# Patient Record
Sex: MALE | Race: WHITE | NOT HISPANIC OR LATINO | Employment: FULL TIME | URBAN - NONMETROPOLITAN AREA
[De-identification: names, ages, dates, MRNs, and addresses within clinical notes are randomized per-mention and may not be internally consistent; named-entity substitution may affect disease eponyms.]

---

## 2017-07-06 ENCOUNTER — AMBULATORY - GICH (OUTPATIENT)
Dept: SCHEDULING | Facility: OTHER | Age: 27
End: 2017-07-06

## 2018-02-22 ENCOUNTER — DOCUMENTATION ONLY (OUTPATIENT)
Dept: FAMILY MEDICINE | Facility: OTHER | Age: 28
End: 2018-02-22

## 2018-02-22 PROBLEM — L70.8 OTHER ACNE: Status: ACTIVE | Noted: 2018-02-22

## 2018-02-22 PROBLEM — N43.3 HYDROCELE, LEFT: Status: ACTIVE | Noted: 2018-02-22

## 2018-02-22 PROBLEM — N45.3 EPIDIDYMO-ORCHITIS: Status: ACTIVE | Noted: 2018-02-22

## 2019-03-28 DIAGNOSIS — Z13.71 SCREENING FOR GENETIC DISEASE CARRIER STATUS: Primary | ICD-10-CM

## 2019-03-28 NOTE — PROGRESS NOTES
Patient's wife is currently pregnant and a cystic fibrosis carrier. Order placed for testing.    Fiona Adan RN...................3/28/2019 10:15 AM

## 2019-04-03 DIAGNOSIS — Z13.71 SCREENING FOR GENETIC DISEASE CARRIER STATUS: ICD-10-CM

## 2019-04-03 PROCEDURE — 36415 COLL VENOUS BLD VENIPUNCTURE: CPT | Performed by: OBSTETRICS & GYNECOLOGY

## 2019-04-03 PROCEDURE — 81220 CFTR GENE COM VARIANTS: CPT | Performed by: OBSTETRICS & GYNECOLOGY

## 2019-04-18 ENCOUNTER — TELEPHONE (OUTPATIENT)
Dept: OBGYN | Facility: OTHER | Age: 29
End: 2019-04-18

## 2019-04-18 NOTE — TELEPHONE ENCOUNTER
Call made to Thuy with [facility]. She seeking to inquire about Cystic Fibrosis results to verify presence or absence of 2 specific markers to make a recommendation of action on his spouse's cystic fibrosis results.       Rima Mendez RN on 4/18/2019 at 10:28 AM     Princess Bailey(PA)

## 2019-04-18 NOTE — TELEPHONE ENCOUNTER
Call placed to [facility] to inquire about status of cystic fibrosis testing.  unable to provide update.  to call [facility] to inquire about ETA of final lab test result.       Rima Mendez RN on 4/18/2019 at 10:09 AM

## 2019-04-18 NOTE — TELEPHONE ENCOUNTER
[facility]  stated that [facility] had to rerun Cystic Fibrosis testing. ETA for final results is today.       Rima Mendez RN on 4/18/2019 at 10:25 AM

## 2019-04-19 LAB
CFTR ALLELE 2 BLD/T QL: NEGATIVE
CFTR P.R117H+5T VAR BLD/T QL: NORMAL
CYSTIC FIBROSIS 165 VARIANT INTERP: NORMAL

## 2019-08-12 ENCOUNTER — OFFICE VISIT (OUTPATIENT)
Dept: FAMILY MEDICINE | Facility: OTHER | Age: 29
End: 2019-08-12
Attending: FAMILY MEDICINE
Payer: COMMERCIAL

## 2019-08-12 VITALS
HEIGHT: 71 IN | DIASTOLIC BLOOD PRESSURE: 80 MMHG | TEMPERATURE: 97.4 F | RESPIRATION RATE: 20 BRPM | BODY MASS INDEX: 26.74 KG/M2 | HEART RATE: 70 BPM | SYSTOLIC BLOOD PRESSURE: 120 MMHG | WEIGHT: 191 LBS

## 2019-08-12 DIAGNOSIS — D22.9 SKIN MOLE: Primary | ICD-10-CM

## 2019-08-12 PROCEDURE — 99212 OFFICE O/P EST SF 10 MIN: CPT | Performed by: FAMILY MEDICINE

## 2019-08-12 ASSESSMENT — ENCOUNTER SYMPTOMS
CHILLS: 0
COLOR CHANGE: 0
COUGH: 0
WOUND: 0
FEVER: 0

## 2019-08-12 ASSESSMENT — PAIN SCALES - GENERAL: PAINLEVEL: NO PAIN (0)

## 2019-08-12 ASSESSMENT — MIFFLIN-ST. JEOR: SCORE: 1853.5

## 2019-08-12 NOTE — PROGRESS NOTES
"  SUBJECTIVE:   Nursing Notes:   Esperanza Louis LPN  8/12/2019  1:34 PM  Sign at exiting of workspace  Chief Complaint   Patient presents with     Derm Problem     mole       Initial /80 (BP Location: Right arm, Patient Position: Sitting, Cuff Size: Adult Regular)   Pulse 70   Temp 97.4  F (36.3  C) (Tympanic)   Resp 20   Ht 1.803 m (5' 11\")   Wt 86.6 kg (191 lb)   BMI 26.64 kg/m    Estimated body mass index is 26.64 kg/m  as calculated from the following:    Height as of this encounter: 1.803 m (5' 11\").    Weight as of this encounter: 86.6 kg (191 lb).  Medication Reconciliation: complete    DARWIN Ross Enedina is a 29 year old male who presents to clinic today to have a mole evaluated.  It has changed a bit over the past few months.  His wife wanted to have him have it checked.  It has not caused pain, itching.  It has not bled or changed color significantly.  It has gotten a little larger.    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  Has had dented left ribs since then.     Personal history of other medical treatment (CODE)     No Comments Provided      Past Surgical History:   Procedure Laterality Date     OTHER SURGICAL HISTORY      [id number],OTHER     Family History   Problem Relation Age of Onset     Hypertension Mother         Hypertension     Hypertension Father         Hypertension     Other - See Comments Maternal Grandfather         Parkinson's disease     Other - See Comments Paternal Grandmother         Macular degeneration     Cancer Paternal Grandfather         Cancer,CLL     Skin " "Cancer Paternal Grandfather         Skin cancer,?basal cell or squamous cell     Hypertension Sister         Hypertension     Social History     Tobacco Use     Smoking status: Never Smoker     Smokeless tobacco: Never Used   Substance Use Topics     Alcohol use: Yes     Alcohol/week: 1.2 oz     Social History     Social History Narrative    Lives with wife and three daughters.  Works for [employer].  Ladi - Wife  Klaudia - daughter  Drake - twin daughter  Shelia - twin daughter    Fredy Mcconnell - Father   Rabia Mcconnell - Mother     No current outpatient medications on file.     No Known Allergies    Review of Systems   Constitutional: Negative for chills and fever.   Respiratory: Negative for cough.    Skin: Negative for color change and wound.        OBJECTIVE:     /80 (BP Location: Right arm, Patient Position: Sitting, Cuff Size: Adult Regular)   Pulse 70   Temp 97.4  F (36.3  C) (Tympanic)   Resp 20   Ht 1.803 m (5' 11\")   Wt 86.6 kg (191 lb)   BMI 26.64 kg/m    Body mass index is 26.64 kg/m .  Physical Exam   Constitutional: He appears well-developed.   HENT:   Head: Normocephalic.   Eyes: Pupils are equal, round, and reactive to light.   Skin:   Right upper posterior arm - there is an approximate 3 mm light brown, slightly raised mole with hair growing out of the center of it.  It does have smooth margins.         PHQ-9 SCORE 10/31/2016   PHQ-9 Total Score 0     I personally reviewed results withpatient as listed below:   Diagnostic Test Results:  none     ASSESSMENT/PLAN:       ICD-10-CM    1. Skin mole D22.9        1.  This has a reassuring appearance and doesn't have featured that look worrisome for skin cancer at this time.  Discussed ABCDs of skin cancer as signs to watch for.  Follow up if any changes noted.    Jaqui Bullard MD  [facility] AND [facility]  " "bronchoscopy biopsy"

## 2019-08-12 NOTE — NURSING NOTE
"Chief Complaint   Patient presents with     Derm Problem     mole       Initial /80 (BP Location: Right arm, Patient Position: Sitting, Cuff Size: Adult Regular)   Pulse 70   Temp 97.4  F (36.3  C) (Tympanic)   Resp 20   Ht 1.803 m (5' 11\")   Wt 86.6 kg (191 lb)   BMI 26.64 kg/m   Estimated body mass index is 26.64 kg/m  as calculated from the following:    Height as of this encounter: 1.803 m (5' 11\").    Weight as of this encounter: 86.6 kg (191 lb).  Medication Reconciliation: complete    Esperanza Louis LPN  "

## 2020-04-13 ENCOUNTER — VIRTUAL VISIT (OUTPATIENT)
Dept: FAMILY MEDICINE | Facility: OTHER | Age: 30
End: 2020-04-13
Attending: FAMILY MEDICINE
Payer: COMMERCIAL

## 2020-04-13 VITALS — WEIGHT: 195 LBS | BODY MASS INDEX: 27.2 KG/M2

## 2020-04-13 DIAGNOSIS — K21.00 GASTROESOPHAGEAL REFLUX DISEASE WITH ESOPHAGITIS: ICD-10-CM

## 2020-04-13 DIAGNOSIS — Z57.9 OCCUPATIONAL EXPOSURE IN WORKPLACE: ICD-10-CM

## 2020-04-13 DIAGNOSIS — R07.89 XYPHOIDALGIA: Primary | ICD-10-CM

## 2020-04-13 DIAGNOSIS — K90.49 FOOD INTOLERANCE: ICD-10-CM

## 2020-04-13 PROCEDURE — 99212 OFFICE O/P EST SF 10 MIN: CPT | Mod: 95 | Performed by: FAMILY MEDICINE

## 2020-04-13 SDOH — HEALTH STABILITY - PHYSICAL HEALTH: OCCUPATIONAL EXPOSURE TO UNSPECIFIED RISK FACTOR: Z57.9

## 2020-04-13 ASSESSMENT — PAIN SCALES - GENERAL: PAINLEVEL: NO PAIN (1)

## 2020-04-13 ASSESSMENT — PATIENT HEALTH QUESTIONNAIRE - PHQ9: SUM OF ALL RESPONSES TO PHQ QUESTIONS 1-9: 0

## 2020-04-13 NOTE — NURSING NOTE
Patient having a phone visit for stomach he feels he may have developed an allergy to possible alcohol and in general he has a woozy feeling. Anything with malt beverage seems to bother him this year. He notices pain near his xyphoid process when he puts pressure on his stomach. He notes that the chest sticks outs more. Medication Reconciliation: complete.    Becca Lyles LPN  4/13/2020 11:16 AM

## 2020-04-13 NOTE — PROGRESS NOTES
"Rosemary Mcconnell is a 30 year old male who is being evaluated via a billable telephone visit.      The patient has been notified of following:     \"This telephone visit will be conducted via a call between you and your physician/provider. We have found that certain health care needs can be provided without the need for a physical exam.  This service lets us provide the care you need with a short phone conversation.  If a prescription is necessary we can send it directly to your pharmacy.  If lab work is needed we can place an order for that and you can then stop by our lab to have the test done at a later time.    Telephone visits are billed at different rates depending on your insurance coverage. During this emergency period, for some insurers they may be billed the same as an in-person visit.  Please reach out to your insurance provider with any questions.    If during the course of the call the physician/provider feels a telephone visit is not appropriate, you will not be charged for this service.\"    Patient has given verbal consent for Telephone visit?  Yes      Subjective     Rosemary Mcconnell is a 30 year old male who presents to clinic today for the following health issues:    Has pain over his xyphoid process.  Cannot lay on his stomach on the floor.  Tender to touch recently.  It does protrude a bit.  Doesn't feel any subcutaneous lumps/cysts/etc.  Doesn't recall doing anything that would have caused this to start hurting.  Sometimes has some gastroesophageal reflux disease symptoms, but not on a regular basis.  Sometimes feels like the discomfort is under his diaphragm.  If it gets bad, might take some peptobismol.    About a year ago, he got sick after drinking just 2 beers.  He would go to bed, then wake up feeling extremely full and would have vomiting and diarrhea.  He can drink apple flavored ciders or aaliyah artois without difficulty.  Neither of these beverages contain malt.  Bud light and " Coors seem to be worse.  He wonders if he has an allergy or intolerance to malt.    He works at [employer].  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  He has not had any testing for several years and wonders how he should go about doing this.               Patient Active Problem List   Diagnosis     Other acne     Epididymo-orchitis     Hydrocele, left     Past Surgical History:   Procedure Laterality Date     OTHER SURGICAL HISTORY      [id number],OTHER       Social History     Tobacco Use     Smoking status: Never Smoker     Smokeless tobacco: Never Used   Substance Use Topics     Alcohol use: Yes     Alcohol/week: 2.0 standard drinks     Comment: moderate      Family History   Problem Relation Age of Onset     Hypertension Mother         Hypertension     Hypertension Father         Hypertension     Other - See Comments Maternal Grandfather         Parkinson's disease     Other - See Comments Paternal Grandmother         Macular degeneration     Cancer Paternal Grandfather         Cancer,CLL     Skin Cancer Paternal Grandfather         Skin cancer,?basal cell or squamous cell     Hypertension Sister         Hypertension         No current outpatient medications on file.     No Known Allergies    Reviewed and updated as needed this visit by Provider         Review of Systems   ROS COMP: Constitutional, HEENT, cardiovascular, pulmonary, gi and gu systems are negative, except as otherwise noted.       Objective   Reported vitals:  Wt 88.5 kg (195 lb)   BMI 27.20 kg/m     healthy, alert and no distress  PSYCH: Alert and oriented times 3; coherent speech, normal   rate and volume, able to articulate logical thoughts, able   to abstract reason, no tangential thoughts, no hallucinations   or delusions  His affect is normal  RESP: No cough, no audible wheezing, able to talk in full sentences  Remainder of exam unable to be completed  due to telephone visits    Diagnostic Test Results:  Labs reviewed in Epic        Assessment/Plan:  1. Xyphoidalgia  May be from the children jumping on his chest and back.  Recommended use of ibuprofen 600 mg 3 times a day for 1 to 2 weeks.  If not improving, he may follow-up by phone.    2. Gastroesophageal reflux disease with esophagitis  He has some mild GERD symptoms.  Discussed that he could use some omeprazole 20 mg daily for 2 weeks, particularly while he is taking omeprazole to make sure that is not flaring this problem.    3. Food intolerance  He may have a food intolerance to MALT.  Order placed for labs to evaluate this per his request.  He will make a lab only appointment at a later date.  - Allergen Malt; Future    4. Occupational exposure in workplace  Recommended that he discuss with his employee health department at work to see what their process is for doing the surveillance testing.  Discussed that if it is no longer offered at his workplace, he may contact us to have spirometry, chest x-ray and hearing evaluation ordered.      No follow-ups on file.      Phone call duration:  13 minutes    Jaqui Bullard MD

## 2020-11-12 ENCOUNTER — NURSE TRIAGE (OUTPATIENT)
Dept: NURSING | Facility: CLINIC | Age: 30
End: 2020-11-12

## 2020-11-13 NOTE — TELEPHONE ENCOUNTER
Called about a sudden pain that he had today while at work.  States the pain was on his right side, over the ribs and groin.  States that the pain was severe and was sweating when this happened, caller states he knows it was his appendix because he does not have one.  States he thinks it was a kidney stone.  Caller rated his pain at the time of this call to be 1/10.  States he has not tried to urinate after the pain had subsided, not sure if urine will be bloody or not and does not know if he will have difficulty urinating.  Lulu Pratt RN  COVID 19 Nurse Triage Plan/Patient Instructions    Please be aware that novel coronavirus (COVID-19) may be circulating in the community. If you develop symptoms such as fever, cough, or SOB or if you have concerns about the presence of another infection including coronavirus (COVID-19), please contact your health care provider or visit [email or web address].     Disposition/Instructions    In-Person Visit with provider recommended. Reference Visit Selection Guide.    Thank you for taking steps to prevent the spread of this virus.  o Limit your contact with others.  o Wear a simple mask to cover your cough.  o Wash your hands well and often.    Resources    [facility]: About COVID-19: [email or web address]    CDC: What to Do If You're Sick: www.cdc.gov/coronavirus/2019-ncov/about/steps-when-sick.html    CDC: Ending Home Isolation: www.cdc.gov/coronavirus/2019-ncov/hcp/disposition-in-home-patients.html     CDC: Caring for Someone: www.cdc.gov/coronavirus/2019-ncov/if-you-are-sick/care-for-someone.html     [facility]: Interim Guidance for Hospital Discharge to Home: www.health.[facility].[place].us/diseases/coronavirus/hcp/hospdischarge.pdf    [facility] clinical trials (COVID-19 research studies): [email or web address].[email or web address]/umn-clinical-trials     Below are the COVID-19 hotlines at the [place] Department of Health ([facility]). Interpreters are available.   o For health  questions: Call 752-775-6414 or 1-460.424.9237 (7 a.m. to 7 p.m.)  o For questions about schools and childcare: Call 554-085-5365 or 1-207.836.7484 (7 a.m. to 7 p.m.)                       Additional Information    Negative: Shock suspected (e.g., cold/pale/clammy skin, too weak to stand, low BP, rapid pulse)    Negative: Sounds like a life-threatening emergency to the triager    Negative: Followed a genital area injury    Negative: Followed a genital area injury (penis, scrotum)    Negative: Vaginal discharge    Negative: Pus (white, yellow) or bloody discharge from end of penis    Negative: [1] Taking antibiotic for urinary tract infection (UTI) AND [2] female    Negative: [1] Taking antibiotic for urinary tract infection (UTI) AND [2] male    Negative: [1] Discomfort (pain, burning or stinging) when passing urine AND [2] pregnant    Negative: [1] Discomfort (pain, burning or stinging) when passing urine AND [2] postpartum (from 0 to 6 weeks after delivery)    Negative: [1] Discomfort (pain, burning or stinging) when passing urine AND [2] female    Negative: [1] Discomfort (pain, burning or stinging) when passing urine AND [2] male    Negative: Pain or itching in the vulvar area    Negative: Pain in scrotum is main symptom    Negative: Blood in the urine is main symptom    Negative: Symptoms arising from use of a urinary catheter (Lee or Coude)    Negative: [1] Unable to urinate (or only a few drops) > 4 hours AND     [2] bladder feels very full (e.g., palpable bladder or strong urge to urinate)    Negative: [1] Decreased urination and [2] drinking very little AND [2] dehydration suspected (e.g., dark urine, no urine > 12 hours, very dry mouth, very lightheaded)    Negative: Patient sounds very sick or weak to the triager    Negative: Fever > 100.5 F (38.1 C)    Side (flank) or lower back pain present    Protocols used: URINARY SYMPTOMS-A-AH

## 2022-04-18 NOTE — PROGRESS NOTES
"Nursing Notes:   Megan Chaney LPN  4/21/2022  9:51 AM  Sign at exiting of workspace  Chief Complaint   Patient presents with     Derm Problem     Here today with multiple skin concerns.     Medication Reconciliation: complete    Megan Chaney LPN      Subjective   Rosemary is a 32 year old who presents for the following health issues     Has had a rash in his groin, butt and around his ears.  Very itchy.  Has a white scale on his ears.  Could not get rid of it.  Tried lotion, changing soaps.  Has not tried any steroids.  His daughter has had a yeast skin infection recently.  They have a water softener as well.  No family history or personal history of psoriasis or eczema.  He has had this issue for almost a year.      Has a bump on his butt that showed up a week and a half ago.  It is getting smaller.  It was painful.  Couldn't tell if it was red.    Interested in getting a vasectomy.  He and his wife have 4 kids and they are certain they are done. She has already had a tubal ligation, but they have a friend who recently had a baby despite having had a tubal ligation as well.  They want to make sure all of their bases are covered.    Has developed a reaction to alcohol if he drinks it.  He gets vomiting and diarrhea after drinking any beer.  Some ciders bother him as well, which contain malt.  If he drinks crown royal and sprite, he is able to tolerate that.  No constriction of his throat or hives with this.  He gets a pain in his belly 1 to several hours after drinking.  Will have to vomit and have diarrhea.  Once he is \"cleaned out\" he feels fine.    Has had pain in both arms.  He has pain over his lateral epicondyles on both sides.  Sharp, burning, shooting type of pain.  Hurts all day.  Lifting making it worse.  Twisting movements makes it worse.  Over the winter, hurt to even lift up his kids.  At times has numbness in his 4th-5th fingers.  His dad had to have ulnar nerve transfer surgery.    History of " Present Illness       Reason for visit:  Check-up  Symptom onset:  More than a month    He eats 2-3 servings of fruits and vegetables daily.He consumes 2 sweetened beverage(s) daily.He exercises with enough effort to increase his heart rate 9 or less minutes per day.  He exercises with enough effort to increase his heart rate 3 or less days per week.   He is taking medications regularly.       Derm      Review of Systems   Constitutional, HEENT, cardiovascular, pulmonary, GI, , musculoskeletal, neuro, skin, endocrine and psych systems are negative, except as otherwise noted.      Objective    There were no vitals taken for this visit.  There is no height or weight on file to calculate BMI.  Physical Exam  Constitutional:       Appearance: Normal appearance.   HENT:      Head: Normocephalic.   Eyes:      Extraocular Movements: Extraocular movements intact.      Pupils: Pupils are equal, round, and reactive to light.   Cardiovascular:      Rate and Rhythm: Normal rate and regular rhythm.      Pulses: Normal pulses.      Heart sounds: Normal heart sounds. No murmur heard.  Pulmonary:      Effort: Pulmonary effort is normal.      Breath sounds: Wheezing present. No rhonchi or rales.   Musculoskeletal:      Comments: No pain with palpation over his lateral or medal epicondyles.      Tinel's negative bilaterally.  Phalen's negative bilaterally.  No reproduction of symptoms with palpation over ulnar grooves.   Skin:     Comments: No current rash around his ears, groin or buttock region.    In the area where the soft tissue mass has been in his right buttock just lateral to the anus, there is no appreciable mass palpable.  There are no overlying skin changes in this area.     Neurological:      Mental Status: He is alert.   Psychiatric:         Mood and Affect: Mood normal.         Behavior: Behavior normal.            Assessment & Plan       ICD-10-CM    1. Rash  R21 terbinafine (LAMISIL) 1 % external cream   2. Soft  tissue mass  M79.89    3. Family planning  Z30.09 Adult Urology Referral   4. Adverse food reaction, initial encounter  T78.1XXA Allergen barley IgE     Allergen barley IgE   5. Paresthesia of both hands  R20.2 EMG   6. Bilateral elbow joint pain  M25.521     M25.522      1.  Rash at the moment has resolved, but he states that it comes and goes.  Given location in his groin and buttock area, may be fungal.  Trial of lamisil.  If not improving, could also could try a topical steroid.  Eczema and psoriasis also in the differential diagnosis among other possibilities.  If still not improving, could consider Dermatology referral.  2.  I cannot feel this today, but it is improving.  Suspect it should resolve completely.  If it does not, would recommend referral to general surgery.  3.  Referred to Urology for vasectomy consult.  4.  Allergy testing to Barley ordered.  Discussed that if he does not have a true allergy, may still have a food reaction such as Food Protein Induced Enterocolitis Syndrome.  If this is normal and continues to have issues despite avoidance of triggers, could consider allergist or GI referral.  5.  Referred for EMG.  6.  If EMG normal, consider Occupational Therapy or Orthopedics referrals.      Jaqui Bullard MD  [facility] AND [facility]

## 2022-04-21 ENCOUNTER — OFFICE VISIT (OUTPATIENT)
Dept: FAMILY MEDICINE | Facility: OTHER | Age: 32
End: 2022-04-21
Attending: FAMILY MEDICINE
Payer: COMMERCIAL

## 2022-04-21 VITALS
TEMPERATURE: 97.3 F | DIASTOLIC BLOOD PRESSURE: 80 MMHG | HEART RATE: 82 BPM | BODY MASS INDEX: 28.01 KG/M2 | OXYGEN SATURATION: 96 % | WEIGHT: 200.8 LBS | RESPIRATION RATE: 16 BRPM | SYSTOLIC BLOOD PRESSURE: 128 MMHG

## 2022-04-21 DIAGNOSIS — R20.2 PARESTHESIA OF BOTH HANDS: ICD-10-CM

## 2022-04-21 DIAGNOSIS — R21 RASH: Primary | ICD-10-CM

## 2022-04-21 DIAGNOSIS — T78.1XXA ADVERSE FOOD REACTION, INITIAL ENCOUNTER: ICD-10-CM

## 2022-04-21 DIAGNOSIS — Z30.09 FAMILY PLANNING: ICD-10-CM

## 2022-04-21 DIAGNOSIS — M25.521 BILATERAL ELBOW JOINT PAIN: ICD-10-CM

## 2022-04-21 DIAGNOSIS — M25.522 BILATERAL ELBOW JOINT PAIN: ICD-10-CM

## 2022-04-21 DIAGNOSIS — M79.89 SOFT TISSUE MASS: ICD-10-CM

## 2022-04-21 PROCEDURE — 86003 ALLG SPEC IGE CRUDE XTRC EA: CPT | Mod: ZL | Performed by: FAMILY MEDICINE

## 2022-04-21 PROCEDURE — 99214 OFFICE O/P EST MOD 30 MIN: CPT | Performed by: FAMILY MEDICINE

## 2022-04-21 PROCEDURE — 36415 COLL VENOUS BLD VENIPUNCTURE: CPT | Mod: ZL | Performed by: FAMILY MEDICINE

## 2022-04-21 RX ORDER — PRENATAL VIT 91/IRON/FOLIC/DHA 28-975-200
COMBINATION PACKAGE (EA) ORAL 2 TIMES DAILY
Qty: 42 G | Refills: 1 | Status: SHIPPED | OUTPATIENT
Start: 2022-04-21 | End: 2022-05-19

## 2022-04-21 ASSESSMENT — PAIN SCALES - GENERAL: PAINLEVEL: NO PAIN (0)

## 2022-04-21 NOTE — NURSING NOTE
Chief Complaint   Patient presents with     Derm Problem     Here today with multiple skin concerns.     Medication Reconciliation: complete    Megan Chaney LPN

## 2022-04-22 LAB — BARLEY IGE QN: <0.1 KU(A)/L

## 2022-05-19 ENCOUNTER — OFFICE VISIT (OUTPATIENT)
Dept: UROLOGY | Facility: OTHER | Age: 32
End: 2022-05-19
Attending: FAMILY MEDICINE
Payer: COMMERCIAL

## 2022-05-19 VITALS
SYSTOLIC BLOOD PRESSURE: 110 MMHG | RESPIRATION RATE: 16 BRPM | WEIGHT: 202.6 LBS | HEART RATE: 87 BPM | OXYGEN SATURATION: 96 % | BODY MASS INDEX: 28.26 KG/M2 | DIASTOLIC BLOOD PRESSURE: 80 MMHG

## 2022-05-19 DIAGNOSIS — R21 RASH: ICD-10-CM

## 2022-05-19 DIAGNOSIS — B35.6 JOCK ITCH: ICD-10-CM

## 2022-05-19 DIAGNOSIS — Z30.09 ENCOUNTER FOR VASECTOMY COUNSELING: Primary | ICD-10-CM

## 2022-05-19 PROCEDURE — 99204 OFFICE O/P NEW MOD 45 MIN: CPT | Performed by: UROLOGY

## 2022-05-19 RX ORDER — PRENATAL VIT 91/IRON/FOLIC/DHA 28-975-200
COMBINATION PACKAGE (EA) ORAL 2 TIMES DAILY
Qty: 42 G | Refills: 1 | Status: SHIPPED | OUTPATIENT
Start: 2022-05-19 | End: 2023-01-12

## 2022-05-19 RX ORDER — FLUCONAZOLE 150 MG/1
150 TABLET ORAL ONCE
Qty: 1 TABLET | Refills: 1 | Status: SHIPPED | OUTPATIENT
Start: 2022-05-19 | End: 2022-05-19

## 2022-05-19 ASSESSMENT — PAIN SCALES - GENERAL: PAINLEVEL: NO PAIN (0)

## 2022-05-19 NOTE — PROGRESS NOTES
Type of Visit  NPV    Chief Complaint  Elective sterilization  Jock itch    HPI  Mr. Mcconnell is a 32 year old male who presents with desire for irreversible, elective sterilization.    He has been considering this decision for years and reports a high level of certainty regarding this decision.   His wife is supportive and has been involved in making this decision.   Their main reason for choosing vasectomy over other dependably reversible methods of contraception is they perceived it as the most secure way of avoiding pregnancy and they dislike other contraceptive measures.   He does not have interest in future fertility.   He has made this decision free any coercion. He does have some anxiety/fear regarding this procedure, specifically regarding the anticipated pain during the procedure.   He is not anxious/fearful of the finality/permanence of the procedure.   He is not anxious/fearful of the potential/risk of complications.  He denies erectile dysfunction.  He denies a history of bleeding disorders or reactions to local anesthetic.    He also has issues regarding jock itch.  He was prescribed terbinifine cream a few weeks ago.  Cream improved but didn't resolve the redness and itching.  The patient has a big  and perspires throughout the workday.      Past Medical History  He  has a past medical history of Closed fracture of one rib and Personal history of other medical treatment (CODE).  Patient Active Problem List   Diagnosis     Other acne     Epididymo-orchitis     Hydrocele, left       Past Surgical History  He  has a past surgical history that includes other surgical history.    Medications  He currently has no medications in their medication list.    Allergies  No Known Allergies    Social History  He  reports that he has never smoked. He has never used smokeless tobacco. He reports current alcohol use of about 2.0 standard drinks of alcohol per week. He reports that he does not use  drugs.  No drug abuse.    Family History  Family History   Problem Relation Age of Onset     Hypertension Mother         Hypertension     Hypertension Father         Hypertension     Other - See Comments Maternal Grandfather         Parkinson's disease     Other - See Comments Paternal Grandmother         Macular degeneration     Cancer Paternal Grandfather         Cancer,CLL     Skin Cancer Paternal Grandfather         Skin cancer,?basal cell or squamous cell     Hypertension Sister         Hypertension       Review of Systems  I personally reviewed the ROS with the patient.    Nursing Notes:   Josy Taveras LPN  5/19/2022  8:25 AM  Addendum  Chief Complaint   Patient presents with     Consult     Vasectomy Consult    patient presents to the clinic today for a vasectomy consult.    Review of Systems:    Weight loss:    No     Recent fever/chills:  No   Night sweats:   No  Current skin rash:  Yes   Recent hair loss:  No  Heat intolerance:  No   Cold intolerance:  No  Chest pain:   No   Palpitations:   No  Shortness of breath:  No   Wheezing:   No  Constipation:    No   Diarrhea:   No   Nausea:   No   Vomiting:   No   Kidney/side pain:  No   Back pain:   No  Frequent headaches:  No   Dizziness:     No  Leg swelling:   No   Calf pain:    No    Parents, brothers or sisters with history of kidney cancer:   No  Parents, brothers or sisters with history of bladder cancer: No       Medication Reconciliation: completed   Josy Taveras LPN  5/19/2022 8:20 AM       Physical Exam  Vitals:    05/19/22 0825   BP: 110/80   BP Location: Right arm   Patient Position: Sitting   Cuff Size: Adult Large   Pulse: 87   Resp: 16   SpO2: 96%   Weight: 91.9 kg (202 lb 9.6 oz)     Constitutional: NAD, WDWN.   Head: NCAT  Eyes: Conjunctivae normal  Cardiovascular: Regular rate.  Pulmonary/Chest: Respirations are even and non-labored bilaterally.  Abdominal: Soft. No distension, tenderness, masses or guarding. No CVA  tenderness.  Neurological: A + O x 3.  Cranial Nerves II-XII grossly intact.  Extremities: MIREILLE x 4, Warm. No clubbing.  No cyanosis.    Skin: Pink, warm and dry.  No rashes noted.  Psychiatric:  Normal mood and affect  Genitourinary:  Phallus normal without lesions, testicles descended bilaterally, no masses.    Bilateral vasa palpable  Dry cracked inguinal skin bilaterally    Assessment  Mr. Mcconnell is a 32 year old male who presents today requesting permanent, irreversible surgical sterilization as well as a second medical problem of jock itch.    The vasectomy procedure was discussed in detail with the patient today including the following:  - Preparation prior to the procedure  - Expectations the day of the procedure  - Risks of the procedure such as sterilization failure, infection, bleeding and/or development of chronic testicular pain.    - Expectations and limitations during recovery    Furthermore, the patient was told he would remain fertile following the procedure until he provided a semen analysis that met the definition of infertile (no sperm present or <100,000 nonmotile sperm/mL).  This is usually planned for 3 months after the procedure.  The patient expressed understanding and desire to proceed.    Provided vasectomy hand out again outlining the above risks and benefits as well as need for using current form of birth control until follow up semen analysis meets the definition of sterility.    Plan  Fluconazole 150mg x 1  Refilled terbinafine topical cream  Schedule vasectomy

## 2022-05-19 NOTE — NURSING NOTE
Chief Complaint   Patient presents with     Consult     Vasectomy Consult    patient presents to the clinic today for a vasectomy consult.    Review of Systems:    Weight loss:    No     Recent fever/chills:  No   Night sweats:   No  Current skin rash:  Yes   Recent hair loss:  No  Heat intolerance:  No   Cold intolerance:  No  Chest pain:   No   Palpitations:   No  Shortness of breath:  No   Wheezing:   No  Constipation:    No   Diarrhea:   No   Nausea:   No   Vomiting:   No   Kidney/side pain:  No   Back pain:   No  Frequent headaches:  No   Dizziness:     No  Leg swelling:   No   Calf pain:    No    Parents, brothers or sisters with history of kidney cancer:   No  Parents, brothers or sisters with history of bladder cancer: No       Medication Reconciliation: completed   Josy Taveras LPN  5/19/2022 8:20 AM

## 2022-05-21 ENCOUNTER — HEALTH MAINTENANCE LETTER (OUTPATIENT)
Age: 32
End: 2022-05-21

## 2022-06-06 ENCOUNTER — OFFICE VISIT (OUTPATIENT)
Dept: UROLOGY | Facility: OTHER | Age: 32
End: 2022-06-06
Attending: UROLOGY
Payer: COMMERCIAL

## 2022-06-06 VITALS
OXYGEN SATURATION: 97 % | HEART RATE: 95 BPM | RESPIRATION RATE: 16 BRPM | WEIGHT: 196 LBS | BODY MASS INDEX: 27.34 KG/M2

## 2022-06-06 DIAGNOSIS — Z30.2 ENCOUNTER FOR VASECTOMY: Primary | ICD-10-CM

## 2022-06-06 PROCEDURE — 55250 REMOVAL OF SPERM DUCT(S): CPT | Performed by: UROLOGY

## 2022-06-06 ASSESSMENT — PAIN SCALES - GENERAL: PAINLEVEL: NO PAIN (0)

## 2022-06-06 NOTE — NURSING NOTE
Chief Complaint   Patient presents with     Procedure     Vasectomy        Vasectomy  Per verbal order read back by Jose Martin Christensen MD to prep patient for vasectomy.  Patient positioned in supine position, perineum area prepped with chlorhexidene Gluconate and patient draped per sterile technique.    Lidocaine 2%  Lot #: [id number]  Expiration date: 01/26  : Iwona Calvillo  NDC: 4337990432    Universal Protocol    A. Pre-procedure verification complete Yes  1-relevant information / documentation available, reviewed and properly matched to the patient; 2-consent accurate and complete, 3-equipment and supplies available    B. Site marking complete Yes  Site marked if not in continuous attendance with patient    C. TIME OUT completed Yes  Time Out was conducted just prior to starting procedure to verify the eight required elements: 1-patient identity, 2-consent accurate and complete, 3-position, 4-correct side/site marked (if applicable), 5-procedure, 6-relevant images / results properly labeled and displayed (if applicable), 7-antibiotics / irrigation fluids (if applicable), 8-safety precautions.    After procedure perineum area rinsed. Semen analysis container given to patient. Patient reminded to have a semen analysis performed 3 months after the procedure to confirm sterility and to ejaculate about 1-2 dozen times following the vasectomy and prior to semen collection. Discharge instructions reviewed with patient. Patient verbalized understanding of discharge instructions and discharged ambulatory.    Medication Reconciliation: completed   Josy Taveras LPN  6/6/2022 11:00 AM

## 2022-06-06 NOTE — PATIENT INSTRUCTIONS
Home Care after Vasectomy   Follow these guidelines for your care after your surgery to help your recovery.     Activity   Limit your activity for the first 5 days after the procedure to light activity.   You may return to work typically in 2 days.   Limit lifting, pushing or pulling to less than 20 pounds until you are no longer sore.   Limit running and long walks until you are no longer sore.   Limit sexual activity for 5 days.     Swelling   Scrotal swelling from the surgery may take weeks to get better. You should call your doctor if the swelling is severe and the scrotum is larger than an orange.  Apply a bag of frozen peas to the scrotum for 15 minutes every 1-2 hours for the first 48 hours when you are awake to limit swelling. It works best to not apply the bag of frozen peas directly to the skin.  Wear a jock strap to support your scrotum and reduce swelling.  Continue wearing the jock strap until you are no longer sore, 1-2 weeks.     Incision care   The single stitch placed in the scrotum will dissolve and does not need to be removed.  A small amount of blood may stain the dressings for up to 2-3 days after the procedure.  For the first few days, apply two or three gauze pads to the site each day and as needed to keep the dressing dry. This will protect the incision and help keep your clothes clean.  When you are no longer having any drainage, stop using the gauze pads over the site.     Bathing or showering   You may shower after the procedure but do not scrub the stitch area.  Allow the water to wash over the stitch and do not scrub the area. Dry the site gently by patting it with a clean towel.  Tub baths should be avoided for 7 days after surgery.  Swimming should be avoided for 14 days after surgery.        Pain control   Please take OTC ibuprofen 200mg tablets as discussed in detail in clinic.  - 4 tablets (800mg) three times a day for 3 days with food to help with swelling.    Sexual activity    Please avoid ejaculating for 5 days after procedure or until soreness is resolved.     Follow up   Following this procedure you are still considered fertile and would be able to impregnate your partner.  You should have a semen analysis performed 3 months or greater after your procedure to confirm sterility.  Ideally you would ejaculate about 2-3 dozen times following the vasectomy and prior to semen collection.  Use birth control until the semen analysis is confirmed sterile.     The semen sample needs to be dropped off at Diagnostic check in desk no later than 30 minutes after collection.  In transport keep sample body temp by keeping it next to your body such as in between your legs.  You do not have to make an appointment to drop off this sample unless you need to do the collection in house.     Use contraceptives until this is confirmed to prevent pregnancy.     Contacts   General Questions: [phone]   Appointments: [phone]   Emergencies: [phone]     When to call your doctor   Call the urology office if you have any of the following:   Severe swelling, larger than the size of an orange   A large amount of fluid drainage that soaks several pads per day   Pain that is not controlled with pain medicine, jock strap and use of frozen peas   Any signs of infection such as the following:   -Redness or tenderness around the incision site worsening after 2-3 days or   -Pus type drainage from the incision or   -Fever of greater than 101 degrees F     Also call the office if you have any questions or concerns about your care.

## 2022-06-06 NOTE — PROGRESS NOTES
Preoperative diagnosis  Elective sterilization    Postoperative diagnosis  Elective sterilization    Procedure performed  Bilateral vasectomy    Surgeon  Jose Martin Christensen MD    Anesthesia  8 mL lidocaine 2% local injection    Complications  None    EBL  <1 mL    Specimen  None    Indications  32 year old male agreed to undergo the above named procedure after discussion of the alternatives, risks and benefits.  Informed consent was obtained.      Procedure  The patient was brought to the procedure room and placed in supine position.  His scrotum was prepped with Hibiclens and he was draped in a sterile fashion.  A timeout was performed.    Lidocaine 2% was used to anesthetize the scrotal skin as well as perivasal sheath initially on the patient's left.  A 1 cm skin incision was created with the sharp-tip mosquito and a vas clamp utilized through this incision to grasp the vas.  The left vas was elevated to the skin surface and dissected free of its perivasal sheath.  The vas was transected and the lumen was cauterized both proximally and distally.  A 4-0 chromic suture was utilized to place the proximal vasal portion under the perivasal sheath, such that the 2 ends were divided by the perivasal sheath (fascial interposition).  This portion of the vas was then allowed to drop back into the left hemiscrotum.  The right side was treated next in the same manner as described above.  The skin incision was closed with the 4-0 chromic suture.  The patient tolerated the procedure well.    Plan  The patient received postvasectomy instructions:  - apply frozen peas  - take scheduled NSAIDs   - wear jock support for the next 2 to 3 days  - abstain from vigorous activity for the next week or so  - continue current family planning/birth control until negative semen analysis is confirmed.      He is aware that he is not considered sterile until follow-up ejaculatory specimens show no evidence of sperm.    We provided instructions to  submit a semen sample in 3 months.

## 2022-06-06 NOTE — LETTER
06/06/22    To whom it ila concern Rosemary Enedina had a procedure today 0606/22 and will need to be off work until 06/13/22    Thank you   Dr. Christensen

## 2022-07-06 ENCOUNTER — OFFICE VISIT (OUTPATIENT)
Dept: FAMILY MEDICINE | Facility: OTHER | Age: 32
End: 2022-07-06
Attending: PHYSICIAN ASSISTANT
Payer: COMMERCIAL

## 2022-07-06 VITALS
HEART RATE: 112 BPM | RESPIRATION RATE: 17 BRPM | DIASTOLIC BLOOD PRESSURE: 86 MMHG | TEMPERATURE: 101.2 F | SYSTOLIC BLOOD PRESSURE: 128 MMHG | OXYGEN SATURATION: 98 % | BODY MASS INDEX: 28.34 KG/M2 | HEIGHT: 71 IN | WEIGHT: 202.4 LBS

## 2022-07-06 DIAGNOSIS — J02.9 SORE THROAT: Primary | ICD-10-CM

## 2022-07-06 DIAGNOSIS — H65.91 OME (OTITIS MEDIA WITH EFFUSION), RIGHT: ICD-10-CM

## 2022-07-06 LAB — GROUP A STREP BY PCR: NOT DETECTED

## 2022-07-06 PROCEDURE — 87651 STREP A DNA AMP PROBE: CPT | Mod: ZL | Performed by: PHYSICIAN ASSISTANT

## 2022-07-06 PROCEDURE — 99213 OFFICE O/P EST LOW 20 MIN: CPT | Performed by: PHYSICIAN ASSISTANT

## 2022-07-06 RX ORDER — AMOXICILLIN 875 MG
875 TABLET ORAL 2 TIMES DAILY
Qty: 14 TABLET | Refills: 0 | Status: SHIPPED | OUTPATIENT
Start: 2022-07-06 | End: 2022-07-13

## 2022-07-06 ASSESSMENT — PAIN SCALES - GENERAL: PAINLEVEL: SEVERE PAIN (7)

## 2022-07-06 NOTE — PROGRESS NOTES
ASSESSMENT/PLAN:    I have reviewed the nursing notes.  I have reviewed the findings, diagnosis, plan and need for follow up with the patient.    1. Sore throat  - Group A Streptococcus PCR Throat Swab  - Strep test returned negative, sore throat is most consistent with viral etiology/cause at this point in time.  Recommend to continue with symptomatic remedies including but not limited to: Salt water gargles, throat lozenges, soups/popsicles, increase hydration, alternating Tylenol and ibuprofen if needed/able and other symptomatic remedies.  - Thinks he may have had COVID in June 2022, similar symptoms to wife who tested positive. Did not test today for this reason.   - He will still remain home until fever free for 24 hours without fever reducers.     2. OME (otitis media with effusion), right  - amoxicillin (AMOXIL) 875 MG tablet; Take 1 tablet (875 mg) by mouth 2 times daily for 7 days  Dispense: 14 tablet; Refill: 0  - Vital signs stable. PE consistent with OME/ear infection of right ear(s). Treatment of choice includes antibiotic regimen (Amoxicillin, alternating tylenol and ibuprofen every 4-6 hours as needed (if able, daily limits reviewed in AVS and verbally with patient), warm compresses, other symptomatic remedies. Avoid trauma to ear(s) such as Q-tips. If symptoms change or worsen, recommend follow up for reevaluation (high fevers, worsening pain, abnormal drainage or odor from ear, etc.). Discussed if ear infections become increasingly common, as this point, a referral to ENT can be made, typically by PCP. Patient is in agreement and understanding of the above treatment plan. All questions and concerns were addressed and answered to patient's satisfaction. AVS reviewed with patient.     Discussed warning signs/symptoms indicative of need to f/u    Follow up if symptoms persist or worsen or concerns    I explained my diagnostic considerations and recommendations to the patient, who voiced understanding  and agreement with the treatment plan. All questions were answered. We discussed potential side effects of any prescribed or recommended therapies, as well as expectations for response to treatments.    Aleida Fonseca  7/6/2022  10:12 AM    HPI:    Rosemary Mcconnell is a 32 year old male  who presents to [facility] today for concerns of URI, x 7-1-2022. Pt reports that he this wife was covid symptoms in June and the rest of the family had symptoms, so he thinks he may have had covid in June.    Symptoms:  Yes fevers or chills. Fever, highest reported temperature: 101.2 here  Yes sore throat/pharyngitis/tonsillitis.   No allergy/URI Symptoms  No Muffled Sounds/Change in Hearing  No Sensation of Fullness in Ear(s)  No Ringing in Ears/Tinnitus  No Balance Changes  Yes Dizziness  No Congestion (head/nasal/chest)  No Cough/Productive Cough  No Post Nasal Drip   Yes Headache  No Sinus Pain/Pressure  Yes Myalgias  Yes Otalgia right ear started last night  Activity Level Changes: Yes: fatigued  Appetite/Liquid Intake Changes: Yes: decreased  Changes to Bowel Habits: Yes: diarrhea, has diarrhea after eating  Changes to Bladder Habits: No  Additional Symptoms to Report: No  History of similar symptoms: No  Prior workup: No    Treatments tried: Tylenol/Ibuprofen, Rest and alkaseltzer +    Site of exposure: not known.  Type of exposure: not known    Vaccination status:   - Influenza: none  - COVID: none    Cardiopulmonary History:  Recent Infections (Pneumonia, etc): No  Smoker: No  Asthma: No  COPD: No  Additional History: No  Cystic Fibrosis: No  Cardiac History Including Stroke/Stent Placement/STEMI/STEMI, etc.: No    Other Pertinent History:     PCP: Saul Escalante MD    Past Medical History:   Diagnosis Date     Closed fracture of one rib     Kicked by horse around age 10.  Has had dented left ribs since then.     Personal history of other medical treatment (CODE)     No Comments Provided     Past Surgical History:  "  Procedure Laterality Date     OTHER SURGICAL HISTORY      [id number],OTHER     Social History     Tobacco Use     Smoking status: Never Smoker     Smokeless tobacco: Never Used   Substance Use Topics     Alcohol use: Yes     Alcohol/week: 2.0 standard drinks     Comment: moderate      Current Outpatient Medications   Medication Sig Dispense Refill     terbinafine (LAMISIL) 1 % external cream Apply topically 2 times daily (Patient not taking: Reported on 7/6/2022) 42 g 1     No Known Allergies  Past medical history, past surgical history, current medications and allergies reviewed and accurate to the best of my knowledge.      ROS:  Refer to HPI    /86   Pulse 112   Temp (!) 101.2  F (38.4  C) (Tympanic)   Resp 17   Ht 1.803 m (5' 11\")   Wt 91.8 kg (202 lb 6.4 oz)   SpO2 98%   BMI 28.23 kg/m      EXAM:  General Appearance: Well appearing 32 year old male, appropriate appearance for age. No acute distress   Ears: Left TM intact, translucent with bony landmarks appreciated, no erythema, no effusion, no bulging, no purulence.  Right TM intact, translucent with bony landmarks appreciated, + erythema,+  effusion, no bulging, no purulence.  Left auditory canal clear.  Right auditory canal clear.  Normal external ears, non tender.  Eyes: conjunctivae normal without erythema or irritation, corneas clear, no drainage or crusting, no eyelid swelling, pupils equal   Oropharynx: moist mucous membranes, posterior pharynx with erythema, tonsils symmetric, + erythema, + exudates, no petechiae, no post nasal drip seen, no trismus, voice clear.    Sinuses:  No sinus tenderness upon palpation of the frontal or maxillary sinuses  Nose:  Bilateral nares: no erythema, no edema, no drainage or congestion   Neck: supple without adenopathy  Respiratory: normal chest wall and respirations.  Normal effort.  Clear to auscultation bilaterally, no wheezing, crackles or rhonchi.  No increased work of breathing.  No cough " appreciated.  Cardiac: RRR with no murmurs  Abdomen: soft, nontender, no rigidity, no rebound tenderness or guarding, normal bowel sounds present  Musculoskeletal:  Equal movement of bilateral upper extremities.  Equal movement of bilateral lower extremities.  Normal gait.    Dermatological: no rashes noted of exposed skin  Psychological: normal affect, alert, oriented, and pleasant.     Labs:  Results for orders placed or performed in visit on 07/06/22   Group A Streptococcus PCR Throat Swab     Status: Normal    Specimen: Throat; Swab   Result Value Ref Range    Group A strep by PCR Not Detected Not Detected    Narrative    The Xpert Xpress Strep A test, performed on the [employer], is a rapid, qualitative in vitro diagnostic test for the detection of Streptococcus pyogenes (Group A ß-hemolytic Streptococcus, Strep A) in throat swab specimens from patients with signs and symptoms of pharyngitis. The Xpert Xpress Strep A test can be used as an aid in the diagnosis of Group A Streptococcal pharyngitis. The assay is not intended to monitor treatment for Group A Streptococcus infections. The Xpert Xpress Strep A test utilizes an automated real-time polymerase chain reaction (PCR) to detect Streptococcus pyogenes DNA.     Xray:  None

## 2022-07-06 NOTE — PATIENT INSTRUCTIONS
"If a strep test was performed:   We will call you with the results of the strep test, in the meantime, information below on viral colds/upper respiratory tract infections were provided.    If the strep test returns \"POSITIVE\" for strep, you will be prescribed an antibiotic, if this is the case, please take the entire course of antibiotic, even if feeling better prior to this. Continue with symptomatic treatment below as needed. If positive, please change toothbrush on day 2.     If the strep test returns \"NEGATIVE\" you do not need antibiotics and are to continue with conservative treatment as outlined below. Continue to monitor symptoms.       If a COVID swab was performed:   Please quarantine until results return which takes 24-72 hours.     If COVID testing is negative, symptoms are improving (no need for antipyretics/fever reducers, etc.), that patient can return to normal activities of daily living.    If you test positive for COVID (regardless of vaccination status): stay home for 5 days. If you have no symptoms or symptoms are resolving after 5 days, you may leave the house per CDC guidelines. Continue to wear a mask around others for 5 days. You should also be fever free for 24 hours without the use of fever reducers (Tylenol/Ibuprofen).     If exposed to COVID to an individual with COVID (if you have received your booster OR if you have completed your primary series of Pfizer or Moderna in last 6 months OR completed the Catrachito & Catrachito (J&J) vaccine in last two months): Wear a mask around others for 10 days, test on day 5 if possible.  If you develop symptoms, take a test and stay home.     If you were exposed to an individual with COVID (if it has been greater than 6 months since your Pfizer or Moderna vaccine and you have not yet received a booster, completed the Catrachito & Catrachito  (J&J) vaccine greater than 2 months ago not received a booster or are not vaccinated): Stay home for 5 days, after this " continue to wear a mask for 5 days around others.  If you cannot quarantine per guidelines you should/must wear a mask for 10 days around others. Test on day 5 if possible. If you develop symptoms, take a test and stay home.     ** Must also be fever free for 24 hours without fever reducers (Tylenol or Ibuprofen).     Please refer to your AVS for follow up and pain/symptoms management recommendations (I.e.: medications, helpful conservative treatment modalities, appropriate follow up if need to a specialist or family practice, etc.). Please return to urgent care if your symptoms change or worsen.     Discharge instructions:  -If you were prescribed a medication(s), please take this as prescribed/directed  -Monitor your symptoms, if changing/worsening, return to UC/ER or PCP for follow up    Symptomatic treatments recommended.  - Antibiotics will not help with your symptoms, unless you were told otherwise today (strep throat, ear infection, etc. ). Education provided on symptoms of secondary bacterial infection such as new fever, chills, rigors, shortness of breath, increased work of breathing, that can occur with viral URI and need for further evaluation, if they occur.   - Ensure you are staying hydrated by drinking plenty of fluids and eating mild foods and advance diet as tolerated  - Honey can be soothing for sore throat (as long as above 12 months of age)  - Warm salt water gurgles can help soothe sore throat  - Humidifier can help with congestion and help keep mucus membranes such as throat and nose from drying out.  - Sleeping slightly propped up can help with congestion and postnasal drainage that can worsen cough at bedtime.  - As long as you have never been told to take Tylenol and/or Ibuprofen you can use them to manage fever and body aches per package instructions  Make sure you eat when you take ibuprofen to avoid stomach upset.  - OTC cough medications per package instructions to help with cough. Check  to see if the cough/cold medication already has acetaminophen (Tylenol) in it. If it does avoid taking additional Tylenol.  - If sudden onset of new fever, worsening symptoms return for further evaluation.  - OTC antihistamine such as Allegra, Zyrtec, Claritin (generic is okay) can help with nasal/sinus congestion and OTC nasal steroid such as Flonase can help decrease sinus inflammation to help with congestion.  - Education provided on symptoms of post-viral bacterial infections including ear infection and pneumonia. This would require re-evaluation for treatment.

## 2022-07-06 NOTE — NURSING NOTE
"Chief Complaint   Patient presents with     Pharyngitis     Started 7/1/2022 with a scratchy throat       Initial /86   Pulse 112   Temp (!) 101.2  F (38.4  C) (Tympanic)   Resp 17   Ht 1.803 m (5' 11\")   Wt 91.8 kg (202 lb 6.4 oz)   SpO2 98%   BMI 28.23 kg/m   Estimated body mass index is 28.23 kg/m  as calculated from the following:    Height as of this encounter: 1.803 m (5' 11\").    Weight as of this encounter: 91.8 kg (202 lb 6.4 oz).  Medication Reconciliation: complete    FOOD SECURITY SCREENING QUESTIONS  Hunger Vital Signs:  Within the past 12 months we worried whether our food would run out before we got money to buy more. Never  Within the past 12 months the food we bought just didn't last and we didn't have money to get more. Never  Yeimi Monet LPN 7/6/2022 10:09 AM      "

## 2022-07-06 NOTE — LETTER
[facility]  [address]  [place] [address]  Phone: [phone]  Fax: [phone]    July 6, 2022        Rosemary Mcconnell  [address] [place] [id number]          To whom it may concern:    RE: Rosemary Mcconnell    Patient was seen and treated today at our clinic and missed work.    Testing in process.       ** Must also be fever free for 24 hours without fever reducers (Tylenol or Ibuprofen).     If strep positive, out x 1 day to allow full day on antibiotics. If strep negative, OK to return to work    Please contact me for questions or concerns.      Sincerely,        Beverly Astorga PA-C

## 2022-09-17 ENCOUNTER — HEALTH MAINTENANCE LETTER (OUTPATIENT)
Age: 32
End: 2022-09-17

## 2023-01-12 ENCOUNTER — OFFICE VISIT (OUTPATIENT)
Dept: FAMILY MEDICINE | Facility: OTHER | Age: 33
End: 2023-01-12
Attending: FAMILY MEDICINE
Payer: COMMERCIAL

## 2023-01-12 VITALS
RESPIRATION RATE: 20 BRPM | WEIGHT: 200.4 LBS | SYSTOLIC BLOOD PRESSURE: 110 MMHG | HEART RATE: 82 BPM | DIASTOLIC BLOOD PRESSURE: 78 MMHG | HEIGHT: 71 IN | OXYGEN SATURATION: 94 % | TEMPERATURE: 97.1 F | BODY MASS INDEX: 28.06 KG/M2

## 2023-01-12 DIAGNOSIS — K62.5 BLOOD PER RECTUM: Primary | ICD-10-CM

## 2023-01-12 LAB
BASOPHILS # BLD AUTO: 0 10E3/UL (ref 0–0.2)
BASOPHILS NFR BLD AUTO: 0 %
EOSINOPHIL # BLD AUTO: 0.1 10E3/UL (ref 0–0.7)
EOSINOPHIL NFR BLD AUTO: 2 %
ERYTHROCYTE [DISTWIDTH] IN BLOOD BY AUTOMATED COUNT: 11.7 % (ref 10–15)
HCT VFR BLD AUTO: 46.8 % (ref 40–53)
HGB BLD-MCNC: 16.2 G/DL (ref 13.3–17.7)
IMM GRANULOCYTES # BLD: 0 10E3/UL
IMM GRANULOCYTES NFR BLD: 0 %
LYMPHOCYTES # BLD AUTO: 2.6 10E3/UL (ref 0.8–5.3)
LYMPHOCYTES NFR BLD AUTO: 40 %
MCH RBC QN AUTO: 28.2 PG (ref 26.5–33)
MCHC RBC AUTO-ENTMCNC: 34.6 G/DL (ref 31.5–36.5)
MCV RBC AUTO: 82 FL (ref 78–100)
MONOCYTES # BLD AUTO: 0.5 10E3/UL (ref 0–1.3)
MONOCYTES NFR BLD AUTO: 8 %
NEUTROPHILS # BLD AUTO: 3.2 10E3/UL (ref 1.6–8.3)
NEUTROPHILS NFR BLD AUTO: 50 %
NRBC # BLD AUTO: 0 10E3/UL
NRBC BLD AUTO-RTO: 0 /100
PLATELET # BLD AUTO: 189 10E3/UL (ref 150–450)
RBC # BLD AUTO: 5.74 10E6/UL (ref 4.4–5.9)
WBC # BLD AUTO: 6.4 10E3/UL (ref 4–11)

## 2023-01-12 PROCEDURE — 99214 OFFICE O/P EST MOD 30 MIN: CPT | Performed by: FAMILY MEDICINE

## 2023-01-12 PROCEDURE — 36415 COLL VENOUS BLD VENIPUNCTURE: CPT | Mod: ZL | Performed by: FAMILY MEDICINE

## 2023-01-12 PROCEDURE — 85025 COMPLETE CBC W/AUTO DIFF WBC: CPT | Mod: ZL | Performed by: FAMILY MEDICINE

## 2023-01-12 ASSESSMENT — PAIN SCALES - GENERAL: PAINLEVEL: NO PAIN (0)

## 2023-01-12 NOTE — NURSING NOTE
Patient here for blood in stool for more than 6 months. Medication Reconciliation: complete.    Becca Lyles LPN  1/12/2023 4:26 PM

## 2023-01-13 ASSESSMENT — ENCOUNTER SYMPTOMS: HEMATOCHEZIA: 1

## 2023-01-13 NOTE — PROGRESS NOTES
"  Assessment & Plan     Blood per rectum  CBC normal.  Will discuss with general surgery about next step.  - CBC and Differential; Future  - CBC and Differential             BMI:   Estimated body mass index is 27.95 kg/m  as calculated from the following:    Height as of this encounter: 1.803 m (5' 11\").    Weight as of this encounter: 90.9 kg (200 lb 6.4 oz).           No follow-ups on file.    Kris Min MD  [facility]   Rosemary is a 32 year old, presenting for the following health issues:  Rectal Bleeding      Patient arrives here because of rectal bleeding.  He reports has been giving it time.  Is been going on for 6 months.  He states typically red.  And usually with bowel movements.  Occasionally smells.  He denies any hemorrhoids that he is aware of.  No significant cramping.  No family history of colitis.    Rectal Bleeding    History of Present Illness       Reason for visit:  Blood in poop  Symptom onset:  More than a month  Symptoms include:  Sore itchy butt  Symptom intensity:  Moderate  Symptom progression:  Staying the same  Had these symptoms before:  No    He eats 2-3 servings of fruits and vegetables daily.He consumes 1 sweetened beverage(s) daily.He exercises with enough effort to increase his heart rate 9 or less minutes per day.  He exercises with enough effort to increase his heart rate 3 or less days per week.   He is taking medications regularly.             Review of Systems   Gastrointestinal: Positive for hematochezia.            Objective    /78   Pulse 82   Temp 97.1  F (36.2  C)   Resp 20   Ht 1.803 m (5' 11\")   Wt 90.9 kg (200 lb 6.4 oz)   SpO2 94%   BMI 27.95 kg/m    Body mass index is 27.95 kg/m .  Physical Exam  Constitutional:       Appearance: Normal appearance.   Cardiovascular:      Rate and Rhythm: Normal rate and regular rhythm.   Pulmonary:      Effort: Pulmonary effort is normal.      Breath sounds: Normal breath " sounds.   Abdominal:      General: Abdomen is flat. There is no distension.      Tenderness: There is no abdominal tenderness.   Genitourinary:     Rectum: Normal.      Comments: Declines rectal exam  Neurological:      Mental Status: He is alert.   Psychiatric:         Mood and Affect: Mood normal.         Thought Content: Thought content normal.            Results for orders placed or performed in visit on 01/12/23 (from the past 24 hour(s))   CBC and Differential    Narrative    The following orders were created for panel order CBC and Differential.  Procedure                               Abnormality         Status                     ---------                               -----------         ------                     CBC with platelets and d...[[id number]]                      Final result                 Please view results for these tests on the individual orders.   CBC with platelets and differential   Result Value Ref Range    WBC Count 6.4 4.0 - 11.0 10e3/uL    RBC Count 5.74 4.40 - 5.90 10e6/uL    Hemoglobin 16.2 13.3 - 17.7 g/dL    Hematocrit 46.8 40.0 - 53.0 %    MCV 82 78 - 100 fL    MCH 28.2 26.5 - 33.0 pg    MCHC 34.6 31.5 - 36.5 g/dL    RDW 11.7 10.0 - 15.0 %    Platelet Count 189 150 - 450 10e3/uL    % Neutrophils 50 %    % Lymphocytes 40 %    % Monocytes 8 %    % Eosinophils 2 %    % Basophils 0 %    % Immature Granulocytes 0 %    NRBCs per 100 WBC 0 <1 /100    Absolute Neutrophils 3.2 1.6 - 8.3 10e3/uL    Absolute Lymphocytes 2.6 0.8 - 5.3 10e3/uL    Absolute Monocytes 0.5 0.0 - 1.3 10e3/uL    Absolute Eosinophils 0.1 0.0 - 0.7 10e3/uL    Absolute Basophils 0.0 0.0 - 0.2 10e3/uL    Absolute Immature Granulocytes 0.0 <=0.4 10e3/uL    Absolute NRBCs 0.0 10e3/uL

## 2023-01-18 ENCOUNTER — TELEPHONE (OUTPATIENT)
Dept: FAMILY MEDICINE | Facility: OTHER | Age: 33
End: 2023-01-18
Payer: COMMERCIAL

## 2023-01-18 ENCOUNTER — MYC MEDICAL ADVICE (OUTPATIENT)
Dept: FAMILY MEDICINE | Facility: OTHER | Age: 33
End: 2023-01-18
Payer: COMMERCIAL

## 2023-01-18 NOTE — TELEPHONE ENCOUNTER
Dr. Min stopped to discuss the patient's current symptoms. Will get the patient scheduled for a colonoscopy. Keara Everett RN  ....................  1/18/2023   1:09 PM

## 2023-03-01 DIAGNOSIS — Z12.11 ENCOUNTER FOR SCREENING COLONOSCOPY: Primary | ICD-10-CM

## 2023-03-01 NOTE — TELEPHONE ENCOUNTER
Screening Questions for the Scheduling of Screening Colonoscopies   (If Colonoscopy is diagnostic, Provider should review the chart before scheduling.)  Are you younger than 50 or older than 80?  YES  Do you take aspirin or fish oil?  NO (if yes, tell patient to stop 1 week prior to Colonoscopy)  Do you take warfarin (Coumadin), clopidogrel (Plavix), apixaban (Eliquis), dabigatram (Pradaxa), rivaroxaban (Xarelto) or any blood thinner? NO  Do you use oxygen at home?  NO  Do you have kidney disease? NO  Are you on dialysis? NO  Have you had a stroke or heart attack in the last year? NO  Have you had a stent in your heart or any blood vessel in the last year? NO  Have you had a transplant of any organ? NO  Have you had a colonoscopy or upper endoscopy (EGD) before? NO  Date of scheduled Colonoscopy. 04/03/2023  Provider RUDOLPH  Pharmacy [facility]

## 2023-03-03 RX ORDER — POLYETHYLENE GLYCOL 3350, SODIUM CHLORIDE, SODIUM BICARBONATE, POTASSIUM CHLORIDE 420; 11.2; 5.72; 1.48 G/4L; G/4L; G/4L; G/4L
4000 POWDER, FOR SOLUTION ORAL ONCE
Qty: 4000 ML | Refills: 0 | Status: SHIPPED | OUTPATIENT
Start: 2023-03-27 | End: 2023-03-27

## 2023-03-03 RX ORDER — BISACODYL 5 MG/1
TABLET, DELAYED RELEASE ORAL
Qty: 2 TABLET | Refills: 0 | Status: ON HOLD | OUTPATIENT
Start: 2023-03-27 | End: 2023-04-03

## 2023-03-28 PROBLEM — N45.3 EPIDIDYMO-ORCHITIS: Status: RESOLVED | Noted: 2018-02-22 | Resolved: 2023-03-28

## 2023-03-28 PROBLEM — K62.5 BLOOD PER RECTUM: Status: ACTIVE | Noted: 2023-03-28

## 2023-04-03 ENCOUNTER — ANESTHESIA (OUTPATIENT)
Dept: SURGERY | Facility: OTHER | Age: 33
End: 2023-04-03
Payer: COMMERCIAL

## 2023-04-03 ENCOUNTER — ANESTHESIA EVENT (OUTPATIENT)
Dept: SURGERY | Facility: OTHER | Age: 33
End: 2023-04-03
Payer: COMMERCIAL

## 2023-04-03 ENCOUNTER — HOSPITAL ENCOUNTER (OUTPATIENT)
Facility: OTHER | Age: 33
Discharge: HOME | End: 2023-04-03
Attending: SURGERY | Admitting: SURGERY
Payer: COMMERCIAL

## 2023-04-03 VITALS
DIASTOLIC BLOOD PRESSURE: 80 MMHG | BODY MASS INDEX: 27.02 KG/M2 | OXYGEN SATURATION: 96 % | RESPIRATION RATE: 16 BRPM | WEIGHT: 193 LBS | HEIGHT: 71 IN | TEMPERATURE: 97.5 F | HEART RATE: 60 BPM | SYSTOLIC BLOOD PRESSURE: 124 MMHG

## 2023-04-03 PROCEDURE — 45378 DIAGNOSTIC COLONOSCOPY: CPT | Performed by: SURGERY

## 2023-04-03 PROCEDURE — 250N000009 HC RX 250: Performed by: NURSE ANESTHETIST, CERTIFIED REGISTERED

## 2023-04-03 PROCEDURE — 45378 DIAGNOSTIC COLONOSCOPY: CPT | Performed by: NURSE ANESTHETIST, CERTIFIED REGISTERED

## 2023-04-03 PROCEDURE — 258N000003 HC RX IP 258 OP 636: Performed by: SURGERY

## 2023-04-03 PROCEDURE — 250N000011 HC RX IP 250 OP 636: Performed by: NURSE ANESTHETIST, CERTIFIED REGISTERED

## 2023-04-03 PROCEDURE — 999N000010 HC STATISTIC ANES STAT CODE-CRNA PER MINUTE: Performed by: SURGERY

## 2023-04-03 RX ORDER — FLUMAZENIL 0.1 MG/ML
0.2 INJECTION, SOLUTION INTRAVENOUS
Status: DISCONTINUED | OUTPATIENT
Start: 2023-04-03 | End: 2023-04-03 | Stop reason: HOSPADM

## 2023-04-03 RX ORDER — NALOXONE HYDROCHLORIDE 0.4 MG/ML
0.4 INJECTION, SOLUTION INTRAMUSCULAR; INTRAVENOUS; SUBCUTANEOUS
Status: DISCONTINUED | OUTPATIENT
Start: 2023-04-03 | End: 2023-04-03 | Stop reason: HOSPADM

## 2023-04-03 RX ORDER — NALOXONE HYDROCHLORIDE 0.4 MG/ML
0.2 INJECTION, SOLUTION INTRAMUSCULAR; INTRAVENOUS; SUBCUTANEOUS
Status: DISCONTINUED | OUTPATIENT
Start: 2023-04-03 | End: 2023-04-03 | Stop reason: HOSPADM

## 2023-04-03 RX ORDER — LIDOCAINE 40 MG/G
CREAM TOPICAL
Status: DISCONTINUED | OUTPATIENT
Start: 2023-04-03 | End: 2023-04-03 | Stop reason: HOSPADM

## 2023-04-03 RX ORDER — LIDOCAINE HYDROCHLORIDE 20 MG/ML
INJECTION, SOLUTION INFILTRATION; PERINEURAL PRN
Status: DISCONTINUED | OUTPATIENT
Start: 2023-04-03 | End: 2023-04-03

## 2023-04-03 RX ORDER — PROPOFOL 10 MG/ML
INJECTION, EMULSION INTRAVENOUS PRN
Status: DISCONTINUED | OUTPATIENT
Start: 2023-04-03 | End: 2023-04-03

## 2023-04-03 RX ORDER — SODIUM CHLORIDE, SODIUM LACTATE, POTASSIUM CHLORIDE, CALCIUM CHLORIDE 600; 310; 30; 20 MG/100ML; MG/100ML; MG/100ML; MG/100ML
INJECTION, SOLUTION INTRAVENOUS CONTINUOUS
Status: DISCONTINUED | OUTPATIENT
Start: 2023-04-03 | End: 2023-04-03 | Stop reason: HOSPADM

## 2023-04-03 RX ORDER — PROPOFOL 10 MG/ML
INJECTION, EMULSION INTRAVENOUS CONTINUOUS PRN
Status: DISCONTINUED | OUTPATIENT
Start: 2023-04-03 | End: 2023-04-03

## 2023-04-03 RX ORDER — ONDANSETRON 2 MG/ML
4 INJECTION INTRAMUSCULAR; INTRAVENOUS
Status: DISCONTINUED | OUTPATIENT
Start: 2023-04-03 | End: 2023-04-03 | Stop reason: HOSPADM

## 2023-04-03 RX ADMIN — PROPOFOL 50 MG: 10 INJECTION, EMULSION INTRAVENOUS at 09:32

## 2023-04-03 RX ADMIN — LIDOCAINE HYDROCHLORIDE 60 MG: 20 INJECTION, SOLUTION INFILTRATION; PERINEURAL at 09:24

## 2023-04-03 RX ADMIN — SODIUM CHLORIDE, POTASSIUM CHLORIDE, SODIUM LACTATE AND CALCIUM CHLORIDE: 600; 310; 30; 20 INJECTION, SOLUTION INTRAVENOUS at 08:36

## 2023-04-03 RX ADMIN — PROPOFOL 100 MG: 10 INJECTION, EMULSION INTRAVENOUS at 09:24

## 2023-04-03 RX ADMIN — PROPOFOL 180 MCG/KG/MIN: 10 INJECTION, EMULSION INTRAVENOUS at 09:24

## 2023-04-03 ASSESSMENT — ACTIVITIES OF DAILY LIVING (ADL)
ADLS_ACUITY_SCORE: 35

## 2023-04-03 NOTE — OP NOTE
PROCEDURE NOTE    DATE OF SERVICE: 4/3/2023    SURGEON: Luis Buchanan MD    PRE-OP DIAGNOSIS:      Rectal bleeding        POST-OP DIAGNOSIS:  Same  Normal Exam      PROCEDURE:   Colonoscopy    ANESTHESIA:  DONNA Amezquita CRNA    INDICATION FOR THE PROCEDURE: The patient is a 33 year old male with rectal bleeding . The patient has no other complaints  . After explaining the risks to include bleeding, perforation, potential inability toreach the cecum, the patient wished to proceed.    PROCEDURE:After adequate sedation, the patient was in the left lateral decubitus position.  Rectal exam was performed.  There was normal tone and no palpable masses , no fissures or external hemorrhoids.  The colonoscope was introduced into the rectum and advanced to the cecum with Moderate difficulty.  The patient's prep was excellent.  The terminal cecum was reached.  The cecum, ascending, transverse, descending and sigmoid colon was unremarkable .  The scope was retroflexed in the rectum.  The rectum was unremarkable  .  The scope was straightened and removed.  The patient tolerated the procedure well.     ESTIMATED BLOOD LOSS: none    COMPLICATIONS:  None    TISSUE REMOVED:  No    RECOMMEND:      Follow-up at 45 years of age      Luis Buchanan MD FACS

## 2023-04-03 NOTE — ANESTHESIA PREPROCEDURE EVALUATION
Anesthesia Pre-Procedure Evaluation    Patient: Rosemary Mcconnell   MRN: [id number] : 1990        Procedure : Procedure(s):  Colonoscopy          Past Medical History:   Diagnosis Date     Closed fracture of one rib     Kicked by horse around age 10.  Has had dented left ribs since then.     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 Type: General.        ROS/MED HX  ENT/Pulmonary:  - neg pulmonary ROS     Neurologic:  - neg neurologic ROS     Cardiovascular:  - neg cardiovascular ROS     METS/Exercise Tolerance: >4 METS    Hematologic:  - neg hematologic  ROS     Musculoskeletal:  - neg musculoskeletal ROS     GI/Hepatic:     (+) bowel prep,     Renal/Genitourinary:  - neg Renal ROS     Endo:  - neg endo ROS     Psychiatric/Substance Use:  - neg psychiatric ROS     Infectious Disease:  - neg infectious disease ROS     Malignancy:  - neg malignancy ROS     Other:  - neg other ROS          Physical Exam    Airway        Mallampati: I   TM distance: > 3 FB   Neck ROM: full   Mouth opening: > 3 cm    Respiratory Devices and Support         Dental       (+) Completely normal teeth      Cardiovascular   cardiovascular exam normal          Pulmonary   pulmonary exam normal                OUTSIDE LABS:  CBC:   Lab Results   Component Value Date    WBC 6.4 2023    HGB 16.2 2023    HGB 16.1 10/31/2016    HCT 46.8  01/12/2023    HCT 46.8 10/31/2016     01/12/2023     10/31/2016     BMP:   Lab Results   Component Value Date     10/31/2016    POTASSIUM 3.9 10/31/2016    CHLORIDE 103 10/31/2016    CO2 28 10/31/2016    BUN 15 10/31/2016    CR 1.09 10/31/2016    GLC 97 10/31/2016     COAGS: No results found for: PTT, INR, FIBR  POC: No results found for: BGM, HCG, HCGS  HEPATIC:   Lab Results   Component Value Date    ALBUMIN 4.5 10/31/2016    PROTTOTAL 7.3 10/31/2016    ALKPHOS 96 10/31/2016    BILITOTAL 0.5 10/31/2016     OTHER:   Lab Results   Component Value Date    RADHA 9.0 10/31/2016       Anesthesia Plan    ASA Status:  1   NPO Status:  NPO Appropriate    Anesthesia Type: MAC.     - Reason for MAC: straight local not clinically adequate              Consents    Anesthesia Plan(s) and associated risks, benefits, and realistic alternatives discussed. Questions answered and patient/representative(s) expressed understanding.     - Discussed: Risks, Benefits and Alternatives for BOTH SEDATION and the PROCEDURE were discussed     - Discussed with:  Patient         Postoperative Care            Comments:                JEREMIAH MACKENZIE CRNA

## 2023-04-03 NOTE — ANESTHESIA POSTPROCEDURE EVALUATION
Patient: Rosemary Mcconnell    Procedure: Procedure(s):  Colonoscopy       Anesthesia Type:  MAC    Note:  Disposition: Outpatient   Postop Pain Control: Uneventful            Sign Out: Well controlled pain   PONV: No   Neuro/Psych: Uneventful            Sign Out: Acceptable/Baseline neuro status   Airway/Respiratory: Uneventful            Sign Out: Acceptable/Baseline resp. status   CV/Hemodynamics: Uneventful            Sign Out: Acceptable CV status; No obvious hypovolemia; No obvious fluid overload   Other NRE: NONE   DID A NON-ROUTINE EVENT OCCUR?            Last vitals:  Vitals Value Taken Time   /80 04/03/23 1015   Temp     Pulse 60 04/03/23 1015   Resp     SpO2 97 % 04/03/23 1021   Vitals shown include unvalidated device data.    Electronically Signed By: JEREMIAH MACKENZIE CRNA  April 3, 2023  10:35 AM

## 2023-04-03 NOTE — H&P
History and Physical    CHIEF COMPLAINT / REASON FOR PROCEDURE:  Rectal bleeding    PERTINENT HISTORY   Patient is a 33 year old male who presents today for colonoscopy for rectal bleeding.   Has some itching. No further bleeding. No diarrhea      Past Medical History:   Diagnosis Date     Closed fracture of one rib     Kicked by horse around age 10.  Has had dented left ribs since then.     Epididymo-orchitis 2/22/2018    Overview:  recurrent in 2007     Personal history of other medical treatment (CODE)     No Comments Provided     Past Surgical History:   Procedure Laterality Date     OTHER SURGICAL HISTORY      [id number],OTHER       Bleeding tendencies:  No    ALLERGIES/SENSITIVITIES: No Known Allergies     CURRENT MEDICATIONS:    Prior to Admission medications    Not on File       Physical Exam:  There were no vitals taken for this visit.  EXAM:  Chest/Respiratory Exam: Normal - Clear to auscultation without rales, rhonchi, or wheezing.  Cardiovascular Exam: normal, regular rate and rhythm        PLAN: COLONOSCOPY .  Patient understands risks of bleeding, perforation, potential inability to reach cecum, aspiration and wishes to proceed.

## 2023-04-03 NOTE — ANESTHESIA CARE TRANSFER NOTE
Patient: Rosemary Mcconnell    Procedure: Procedure(s):  Colonoscopy       Diagnosis: Blood per rectum [K62.5]  Diagnosis Additional Information: No value filed.    Anesthesia Type:   MAC     Note:    Oropharynx: oropharynx clear of all foreign objects  Level of Consciousness: awake  Oxygen Supplementation: nasal cannula  Level of Supplemental Oxygen (L/min / FiO2): 2  Independent Airway: airway patency satisfactory and stable  Dentition: dentition unchanged  Vital Signs Stable: post-procedure vital signs reviewed and stable  Report to RN Given: handoff report given  Patient transferred to: Phase II    Handoff Report: Identifed the Patient, Identified the Reponsible Provider, Reviewed the pertinent medical history, Discussed the surgical course, Reviewed Intra-OP anesthesia mangement and issues during anesthesia, Set expectations for post-procedure period and Allowed opportunity for questions and acknowledgement of understanding      Vitals:  Vitals Value Taken Time   BP     Temp     Pulse     Resp     SpO2         Electronically Signed By: JEREMIAH Jo CRNA  April 3, 2023  9:52 AM

## 2023-06-04 ENCOUNTER — HEALTH MAINTENANCE LETTER (OUTPATIENT)
Age: 33
End: 2023-06-04

## 2023-06-30 ENCOUNTER — LAB (OUTPATIENT)
Dept: LAB | Facility: OTHER | Age: 33
End: 2023-06-30
Attending: FAMILY MEDICINE
Payer: COMMERCIAL

## 2023-06-30 DIAGNOSIS — Z98.52 STATUS POST VASECTOMY: Primary | ICD-10-CM

## 2023-06-30 LAB
SPECIMEN VOL SMN: 4.9 ML
SPERM MOTILE SMN QL MICRO: NORMAL
SPERM P VAS #/AREA SMN HPF: NORMAL /[HPF]

## 2023-06-30 PROCEDURE — 89310 SEMEN ANALYSIS W/COUNT: CPT | Mod: ZL

## 2023-06-30 PROCEDURE — 89321 SEMEN ANAL SPERM DETECTION: CPT | Mod: ZL

## 2024-07-13 ENCOUNTER — HEALTH MAINTENANCE LETTER (OUTPATIENT)
Age: 34
End: 2024-07-13

## 2025-07-19 ENCOUNTER — HEALTH MAINTENANCE LETTER (OUTPATIENT)
Age: 35
End: 2025-07-19

## (undated) DEVICE — ENDO KIT COMPLIANCE DYKENDOCMPLY

## (undated) DEVICE — SUCTION MANIFOLD NEPTUNE 2 SYS 4 PORT 0702-020-000

## (undated) DEVICE — ENDO BRUSH CHANNEL MASTER CLEANING 2-4.2MM BW-412T

## (undated) DEVICE — TUBING SUCTION 10'X3/16" N510

## (undated) DEVICE — SOL WATER 1500ML